# Patient Record
Sex: MALE | Race: WHITE | URBAN - METROPOLITAN AREA
[De-identification: names, ages, dates, MRNs, and addresses within clinical notes are randomized per-mention and may not be internally consistent; named-entity substitution may affect disease eponyms.]

---

## 2023-04-27 ENCOUNTER — OUT OF OFFICE VISIT (OUTPATIENT)
Dept: URBAN - METROPOLITAN AREA MEDICAL CENTER 12 | Facility: MEDICAL CENTER | Age: 63
End: 2023-04-27
Payer: COMMERCIAL

## 2023-04-27 DIAGNOSIS — R13.12 DYSPHAGIA: ICD-10-CM

## 2023-04-27 PROCEDURE — G8427 DOCREV CUR MEDS BY ELIG CLIN: HCPCS | Performed by: INTERNAL MEDICINE

## 2023-04-27 PROCEDURE — 99221 1ST HOSP IP/OBS SF/LOW 40: CPT | Performed by: INTERNAL MEDICINE

## 2023-04-28 ENCOUNTER — OUT OF OFFICE VISIT (OUTPATIENT)
Dept: URBAN - METROPOLITAN AREA MEDICAL CENTER 12 | Facility: MEDICAL CENTER | Age: 63
End: 2023-04-28
Payer: COMMERCIAL

## 2023-04-28 DIAGNOSIS — R13.12 DYSPHAGIA: ICD-10-CM

## 2023-04-28 DIAGNOSIS — B37.81 CANDIDA: ICD-10-CM

## 2023-04-28 PROCEDURE — 43246 EGD PLACE GASTROSTOMY TUBE: CPT | Performed by: INTERNAL MEDICINE

## 2023-05-08 ENCOUNTER — WEB ENCOUNTER (OUTPATIENT)
Dept: URBAN - METROPOLITAN AREA CLINIC 92 | Facility: CLINIC | Age: 63
End: 2023-05-08

## 2023-05-08 ENCOUNTER — OFFICE VISIT (OUTPATIENT)
Dept: URBAN - METROPOLITAN AREA CLINIC 92 | Facility: CLINIC | Age: 63
End: 2023-05-08
Payer: COMMERCIAL

## 2023-05-08 ENCOUNTER — LAB OUTSIDE AN ENCOUNTER (OUTPATIENT)
Dept: URBAN - METROPOLITAN AREA CLINIC 92 | Facility: CLINIC | Age: 63
End: 2023-05-08

## 2023-05-08 VITALS
TEMPERATURE: 97.2 F | HEIGHT: 74 IN | BODY MASS INDEX: 27.54 KG/M2 | HEART RATE: 67 BPM | SYSTOLIC BLOOD PRESSURE: 120 MMHG | DIASTOLIC BLOOD PRESSURE: 71 MMHG | WEIGHT: 214.6 LBS

## 2023-05-08 DIAGNOSIS — K94.29 IRRITATION AROUND PERCUTANEOUS ENDOSCOPIC GASTROSTOMY (PEG) TUBE SITE: ICD-10-CM

## 2023-05-08 DIAGNOSIS — L03.311 CELLULITIS OF ABDOMINAL WALL: ICD-10-CM

## 2023-05-08 DIAGNOSIS — R13.12 OROPHARYNGEAL DYSPHAGIA: ICD-10-CM

## 2023-05-08 PROBLEM — 71457002: Status: ACTIVE | Noted: 2023-05-08

## 2023-05-08 PROCEDURE — 99214 OFFICE O/P EST MOD 30 MIN: CPT | Performed by: INTERNAL MEDICINE

## 2023-05-08 RX ORDER — SULFAMETHOXAZOLE AND TRIMETHOPRIM 800; 160 MG/1; MG/1
TAKE 2 TABLET BY MOUTH TWICE DAILY TABLET ORAL
Qty: 28 EACH | Refills: 0 | Status: ACTIVE | COMMUNITY

## 2023-05-08 RX ORDER — BLOOD-GLUCOSE METER
EACH MISCELLANEOUS
Qty: 1 EACH | Refills: 0 | Status: ACTIVE | COMMUNITY

## 2023-05-08 RX ORDER — ALBUTEROL SULFATE 108 UG/1
INHALANT RESPIRATORY (INHALATION)
Qty: 6.7 GRAM | Status: ACTIVE | COMMUNITY

## 2023-05-08 RX ORDER — FAMOTIDINE 40 MG/1
TABLET, FILM COATED ORAL
Qty: 90 TABLET | Status: ACTIVE | COMMUNITY

## 2023-05-08 RX ORDER — FLUCONAZOLE 200 MG/1
TABLET ORAL
Qty: 13 TABLET | Status: ACTIVE | COMMUNITY

## 2023-05-08 RX ORDER — OXYCODONE HYDROCHLORIDE 5 MG/1
TAKE 1 TABLET BY MOUTH EVERY 6 HOURS AS NEEDED FOR PAIN TABLET ORAL
Qty: 10 EACH | Refills: 0 | Status: ACTIVE | COMMUNITY

## 2023-05-08 RX ORDER — OMEGA-3/DHA/EPA/FISH OIL 300-1000MG
TAKE 1 CAPSULE BY MOUTH EVERY DAY CAPSULE ORAL
Qty: 100 EACH | Refills: 1 | Status: ACTIVE | COMMUNITY

## 2023-05-08 RX ORDER — METHOCARBAMOL TABLETS 500 MG/1
TABLET, COATED ORAL
Qty: 60 TABLET | Status: ACTIVE | COMMUNITY

## 2023-05-08 RX ORDER — MONTELUKAST 10 MG/1
TAKE 1 TABLET BY MOUTH BY MOUTH ONCE DAILY TABLET, FILM COATED ORAL
Qty: 3 EACH | Refills: 0 | Status: ACTIVE | COMMUNITY

## 2023-05-08 RX ORDER — DULOXETINE 60 MG/1
CAPSULE, DELAYED RELEASE ORAL
Qty: 90 CAPSULE | Status: ACTIVE | COMMUNITY

## 2023-05-08 RX ORDER — TAMSULOSIN HYDROCHLORIDE 0.4 MG/1
CAPSULE ORAL
Qty: 60 CAPSULE | Status: ACTIVE | COMMUNITY

## 2023-05-08 RX ORDER — LANCETS 33 GAUGE
EACH MISCELLANEOUS
Qty: 100 EACH | Refills: 0 | Status: ACTIVE | COMMUNITY

## 2023-05-08 RX ORDER — BLOOD SUGAR DIAGNOSTIC
STRIP MISCELLANEOUS
Qty: 100 EACH | Refills: 1 | Status: ACTIVE | COMMUNITY

## 2023-05-08 RX ORDER — MUPIROCIN 20 MG/G
OINTMENT TOPICAL
Qty: 22 GRAM | Status: ACTIVE | COMMUNITY

## 2023-05-08 RX ORDER — OMEPRAZOLE 40 MG/1
CAPSULE, DELAYED RELEASE ORAL
Qty: 90 CAPSULE | Status: ACTIVE | COMMUNITY

## 2023-05-08 RX ORDER — SILDENAFIL 50 MG/1
TABLET, FILM COATED ORAL
Qty: 9 TABLET | Status: ACTIVE | COMMUNITY

## 2023-05-08 RX ORDER — MOLNUPIRAVIR 200 MG/1
CAPSULE ORAL
Qty: 40 CAPSULE | Status: ACTIVE | COMMUNITY

## 2023-05-08 RX ORDER — CLINDAMYCIN HYDROCHLORIDE 300 MG/1
CAPSULE ORAL
Qty: 28 CAPSULE | Status: ACTIVE | COMMUNITY

## 2023-05-08 RX ORDER — EFGARTIGIMOD ALFA 20 MG/ML
INJECTION INTRAVENOUS
Qty: 240 MILLILITER | Status: ACTIVE | COMMUNITY

## 2023-05-08 RX ORDER — METFORMIN HYDROCHLORIDE 500 MG/1
TABLET, FILM COATED ORAL
Qty: 180 TABLET | Status: ACTIVE | COMMUNITY

## 2023-05-08 RX ORDER — DICYCLOMINE HYDROCHLORIDE 20 MG/1
TABLET ORAL
Qty: 30 TABLET | Status: ACTIVE | COMMUNITY

## 2023-05-08 RX ORDER — TESTOSTERONE CYPIONATE 200 MG/ML
INJECTION, SOLUTION INTRAMUSCULAR
Qty: 6 MILLILITER | Refills: 0 | Status: ACTIVE | COMMUNITY

## 2023-05-08 RX ORDER — AZITHROMYCIN 250 MG/1
TABLET, FILM COATED ORAL
Qty: 6 TABLET | Status: ACTIVE | COMMUNITY

## 2023-05-08 RX ORDER — ATORVASTATIN CALCIUM 20 MG/1
TABLET, FILM COATED ORAL
Qty: 90 TABLET | Status: ACTIVE | COMMUNITY

## 2023-05-08 RX ORDER — LISINOPRIL 10 MG/1
TABLET ORAL
Qty: 90 TABLET | Status: ACTIVE | COMMUNITY

## 2023-05-08 RX ORDER — PANTOPRAZOLE SODIUM 40 MG/1
TABLET, DELAYED RELEASE ORAL
Qty: 90 TABLET | Status: ACTIVE | COMMUNITY

## 2023-05-08 RX ORDER — DEXAMETHASONE 2 MG/1
TABLET ORAL
Qty: 46 TABLET | Status: ACTIVE | COMMUNITY

## 2023-05-08 RX ORDER — NIRMATRELVIR AND RITONAVIR 300-100 MG
KIT ORAL
Qty: 30 TABLET | Status: ACTIVE | COMMUNITY

## 2023-05-08 RX ORDER — GABAPENTIN 300 MG/1
CAPSULE ORAL
Qty: 90 CAPSULE | Status: ACTIVE | COMMUNITY

## 2023-05-08 RX ORDER — PYRIDOSTIGMINE BROMIDE 60 MG/1
TABLET ORAL
Qty: 90 TABLET | Status: ACTIVE | COMMUNITY

## 2023-05-08 RX ORDER — SUCRALFATE 1 G/1
TABLET ORAL
Qty: 120 TABLET | Status: ACTIVE | COMMUNITY

## 2023-05-08 RX ORDER — CETIRIZINE HYDROCHLORIDE TABLETS 10 MG/1
TAKE 1 TABLET BY MOUTH EVERY DAY TABLET, FILM COATED ORAL
Qty: 30 EACH | Refills: 0 | Status: ACTIVE | COMMUNITY

## 2023-05-08 RX ORDER — FLUTICASONE PROPIONATE 50 UG/1
SHAKE LIQUID AND USE 1 SPRAY IN EACH NOSTRIL EVERY DAY SPRAY, METERED NASAL
Qty: 16 GRAM | Refills: 0 | Status: ACTIVE | COMMUNITY

## 2023-05-08 NOTE — PHYSICAL EXAM GASTROINTESTINAL
Abdomen , soft, nontender, nondistended , no guarding or rigidity , no masses palpable , normal bowel sounds , Liver and Spleen , no hepatomegaly present , no hepatosplenomegaly , liver nontender , spleen not palpable  G-tube in left upper quadrant slight erythema around the site.  There is some purulence from the tract.  No obvious fluctuation

## 2023-05-08 NOTE — HPI-TODAY'S VISIT:
This is a 63-year-old male presents today for follow-up.  He had a G-tube placed while inpatient after cervical spine surgery.  He did call my office at the end of last week with pain and drainage around his site.  He was empirically given antibiotics.  He notes that this has not made much change.  There is some redness and purulent drainage at the site.  No fever

## 2023-05-09 ENCOUNTER — TELEPHONE ENCOUNTER (OUTPATIENT)
Dept: URBAN - METROPOLITAN AREA CLINIC 105 | Facility: CLINIC | Age: 63
End: 2023-05-09

## 2023-05-11 ENCOUNTER — TELEPHONE ENCOUNTER (OUTPATIENT)
Dept: URBAN - METROPOLITAN AREA CLINIC 105 | Facility: CLINIC | Age: 63
End: 2023-05-11

## 2023-05-11 RX ORDER — SULFAMETHOXAZOLE AND TRIMETHOPRIM 800; 160 MG/1; MG/1
TAKE 2 TABLET BY MOUTH TWICE DAILY TABLET ORAL
Qty: 28 EACH | Refills: 0
End: 2023-05-19

## 2023-05-22 ENCOUNTER — TELEPHONE ENCOUNTER (OUTPATIENT)
Dept: URBAN - METROPOLITAN AREA CLINIC 105 | Facility: CLINIC | Age: 63
End: 2023-05-22

## 2023-05-24 ENCOUNTER — TELEPHONE ENCOUNTER (OUTPATIENT)
Dept: URBAN - METROPOLITAN AREA CLINIC 105 | Facility: CLINIC | Age: 63
End: 2023-05-24

## 2023-06-02 ENCOUNTER — OFFICE VISIT (OUTPATIENT)
Dept: URBAN - METROPOLITAN AREA CLINIC 92 | Facility: CLINIC | Age: 63
End: 2023-06-02
Payer: COMMERCIAL

## 2023-06-02 ENCOUNTER — DASHBOARD ENCOUNTERS (OUTPATIENT)
Age: 63
End: 2023-06-02

## 2023-06-02 VITALS
SYSTOLIC BLOOD PRESSURE: 140 MMHG | BODY MASS INDEX: 28.75 KG/M2 | DIASTOLIC BLOOD PRESSURE: 82 MMHG | WEIGHT: 224 LBS | HEIGHT: 74 IN | TEMPERATURE: 97 F | HEART RATE: 92 BPM

## 2023-06-02 DIAGNOSIS — R13.12 OROPHARYNGEAL DYSPHAGIA: ICD-10-CM

## 2023-06-02 PROCEDURE — 99213 OFFICE O/P EST LOW 20 MIN: CPT | Performed by: INTERNAL MEDICINE

## 2023-06-02 PROCEDURE — 43762 RPLC GTUBE NO REVJ TRC: CPT | Performed by: INTERNAL MEDICINE

## 2023-06-02 RX ORDER — PYRIDOSTIGMINE BROMIDE 60 MG/1
TABLET ORAL
Qty: 90 TABLET | Status: ACTIVE | COMMUNITY

## 2023-06-02 RX ORDER — AZITHROMYCIN 250 MG/1
TABLET, FILM COATED ORAL
Qty: 6 TABLET | Status: ACTIVE | COMMUNITY

## 2023-06-02 RX ORDER — FLUTICASONE PROPIONATE 50 UG/1
SHAKE LIQUID AND USE 1 SPRAY IN EACH NOSTRIL EVERY DAY SPRAY, METERED NASAL
Qty: 16 GRAM | Refills: 0 | Status: ACTIVE | COMMUNITY

## 2023-06-02 RX ORDER — DULOXETINE 60 MG/1
CAPSULE, DELAYED RELEASE ORAL
Qty: 90 CAPSULE | Status: ACTIVE | COMMUNITY

## 2023-06-02 RX ORDER — MUPIROCIN 20 MG/G
OINTMENT TOPICAL
Qty: 22 GRAM | Status: ACTIVE | COMMUNITY

## 2023-06-02 RX ORDER — OMEGA-3/DHA/EPA/FISH OIL 300-1000MG
TAKE 1 CAPSULE BY MOUTH EVERY DAY CAPSULE ORAL
Qty: 100 EACH | Refills: 1 | Status: ACTIVE | COMMUNITY

## 2023-06-02 RX ORDER — BLOOD-GLUCOSE METER
EACH MISCELLANEOUS
Qty: 1 EACH | Refills: 0 | Status: ACTIVE | COMMUNITY

## 2023-06-02 RX ORDER — METFORMIN HYDROCHLORIDE 500 MG/1
TABLET, FILM COATED ORAL
Qty: 180 TABLET | Status: ACTIVE | COMMUNITY

## 2023-06-02 RX ORDER — SUCRALFATE 1 G/1
TABLET ORAL
Qty: 120 TABLET | Status: ACTIVE | COMMUNITY

## 2023-06-02 RX ORDER — ALBUTEROL SULFATE 108 UG/1
INHALANT RESPIRATORY (INHALATION)
Qty: 6.7 GRAM | Status: ACTIVE | COMMUNITY

## 2023-06-02 RX ORDER — NIRMATRELVIR AND RITONAVIR 300-100 MG
KIT ORAL
Qty: 30 TABLET | Status: ACTIVE | COMMUNITY

## 2023-06-02 RX ORDER — CLINDAMYCIN HYDROCHLORIDE 300 MG/1
CAPSULE ORAL
Qty: 28 CAPSULE | Status: ACTIVE | COMMUNITY

## 2023-06-02 RX ORDER — EFGARTIGIMOD ALFA 20 MG/ML
INJECTION INTRAVENOUS
Qty: 240 MILLILITER | Status: ACTIVE | COMMUNITY

## 2023-06-02 RX ORDER — METHOCARBAMOL TABLETS 500 MG/1
TABLET, COATED ORAL
Qty: 60 TABLET | Status: ACTIVE | COMMUNITY

## 2023-06-02 RX ORDER — TESTOSTERONE CYPIONATE 200 MG/ML
INJECTION, SOLUTION INTRAMUSCULAR
Qty: 6 MILLILITER | Refills: 0 | Status: ACTIVE | COMMUNITY

## 2023-06-02 RX ORDER — PANTOPRAZOLE SODIUM 40 MG/1
TABLET, DELAYED RELEASE ORAL
Qty: 90 TABLET | Status: ACTIVE | COMMUNITY

## 2023-06-02 RX ORDER — OXYCODONE HYDROCHLORIDE 5 MG/1
TAKE 1 TABLET BY MOUTH EVERY 6 HOURS AS NEEDED FOR PAIN TABLET ORAL
Qty: 10 EACH | Refills: 0 | Status: ACTIVE | COMMUNITY

## 2023-06-02 RX ORDER — TAMSULOSIN HYDROCHLORIDE 0.4 MG/1
CAPSULE ORAL
Qty: 60 CAPSULE | Status: ACTIVE | COMMUNITY

## 2023-06-02 RX ORDER — CETIRIZINE HYDROCHLORIDE TABLETS 10 MG/1
TAKE 1 TABLET BY MOUTH EVERY DAY TABLET, FILM COATED ORAL
Qty: 30 EACH | Refills: 0 | Status: ACTIVE | COMMUNITY

## 2023-06-02 RX ORDER — LANCETS 33 GAUGE
EACH MISCELLANEOUS
Qty: 100 EACH | Refills: 0 | Status: ACTIVE | COMMUNITY

## 2023-06-02 RX ORDER — FAMOTIDINE 40 MG/1
TABLET, FILM COATED ORAL
Qty: 90 TABLET | Status: ACTIVE | COMMUNITY

## 2023-06-02 RX ORDER — DICYCLOMINE HYDROCHLORIDE 20 MG/1
TABLET ORAL
Qty: 30 TABLET | Status: ACTIVE | COMMUNITY

## 2023-06-02 RX ORDER — MOLNUPIRAVIR 200 MG/1
CAPSULE ORAL
Qty: 40 CAPSULE | Status: ACTIVE | COMMUNITY

## 2023-06-02 RX ORDER — FLUCONAZOLE 200 MG/1
TABLET ORAL
Qty: 13 TABLET | Status: ACTIVE | COMMUNITY

## 2023-06-02 RX ORDER — GABAPENTIN 300 MG/1
CAPSULE ORAL
Qty: 90 CAPSULE | Status: ACTIVE | COMMUNITY

## 2023-06-02 RX ORDER — OMEPRAZOLE 40 MG/1
CAPSULE, DELAYED RELEASE ORAL
Qty: 90 CAPSULE | Status: ACTIVE | COMMUNITY

## 2023-06-02 RX ORDER — ATORVASTATIN CALCIUM 20 MG/1
TABLET, FILM COATED ORAL
Qty: 90 TABLET | Status: ACTIVE | COMMUNITY

## 2023-06-02 RX ORDER — LISINOPRIL 10 MG/1
TABLET ORAL
Qty: 90 TABLET | Status: ACTIVE | COMMUNITY

## 2023-06-02 RX ORDER — DEXAMETHASONE 2 MG/1
TABLET ORAL
Qty: 46 TABLET | Status: ACTIVE | COMMUNITY

## 2023-06-02 RX ORDER — BLOOD SUGAR DIAGNOSTIC
STRIP MISCELLANEOUS
Qty: 100 EACH | Refills: 1 | Status: ACTIVE | COMMUNITY

## 2023-06-02 RX ORDER — MONTELUKAST 10 MG/1
TAKE 1 TABLET BY MOUTH BY MOUTH ONCE DAILY TABLET, FILM COATED ORAL
Qty: 3 EACH | Refills: 0 | Status: ACTIVE | COMMUNITY

## 2023-06-02 RX ORDER — SILDENAFIL 50 MG/1
TABLET, FILM COATED ORAL
Qty: 9 TABLET | Status: ACTIVE | COMMUNITY

## 2023-06-02 NOTE — HPI-TODAY'S VISIT:
63-year-old male for PEG tube removal.  He has been cleared by speech and language therapy to resume regular diet.  With thickened liquids FROM Cervical spine, TMD > 2 FB, Normal oral aperture.